# Patient Record
(demographics unavailable — no encounter records)

---

## 2024-12-30 NOTE — HISTORY OF PRESENT ILLNESS
[FreeTextEntry1] : Mr Rodriguez is a 64 y/o M s/p robotic assisted RUL lobectomy mediastinal lymph node dissection on 7/1/2024. Pt had  initial diagnosis of stage IIIa lung adenocarcinoma underwent induction chemoimmunotherapy. Unfortunately after the procedure he developed a complicated pleural effusion that required drainage. PMHx: current smoker/Former ETOH quit alcohol 26 years,Paranoid Schizophrenic, Pneumonia, COPD. Here today for review of CT.   ECOG 0 Fully Independent Lives with roommate Current Smoker- 3/4 PPD X 50 years Poor dentition Former ETOH quit alcohol 26 years  Their Healthcare team is as follows: PMD: Dr. Baires Oncologist: Dr. Doty  Emergency Contact- Sebas Rodriguez- [phone] Sister in law- Karli Rodriguez- [phone]

## 2024-12-30 NOTE — HISTORY OF PRESENT ILLNESS
[FreeTextEntry1] : Mr Rodriguez is a 64 y/o M s/p robotic assisted RUL lobectomy mediastinal lymph node dissection on 7/1/2024. Pt had  initial diagnosis of stage IIIa lung adenocarcinoma underwent induction chemoimmunotherapy. Unfortunately after the procedure he developed a complicated pleural effusion that required drainage. PMHx: current smoker/Former ETOH quit alcohol 26 years,Paranoid Schizophrenic, Pneumonia, COPD. Here today for review of CT.   ECOG 0 Fully Independent Lives with roommate Current Smoker- 3/4 PPD X 50 years Poor dentition Former ETOH quit alcohol 26 years  Their Healthcare team is as follows: PMD: Dr. Baires Oncologist: Dr. Doty  Emergency Contact- Sebas Rodriguez- [phone] Sister in law- Karli Rodriguez- [phone]

## 2025-02-10 NOTE — REASON FOR VISIT
[Home] : at home, [unfilled] , at the time of the visit. [Medical Office: ([facility])___] : at the medical office located in  [Verbal consent obtained from patient] : the patient, [unfilled] [FreeTextEntry1] : CT Chest Review

## 2025-02-10 NOTE — HISTORY OF PRESENT ILLNESS
[FreeTextEntry1] : Pt scheduled for telehealth however, unable to access telehealth platform due to lack of smart device/computer.  Mr Rodriguez is a 62 y/o M, Current smoker, Called today for review of CT Chest. Pt is s/p robotic assisted RUL lobectomy mediastinal lymph node dissection on 7/1/2024. Pt had  initial diagnosis of stage IIIa lung adenocarcinoma underwent induction chemoimmunotherapy. Unfortunately after the procedure he developed a complicated pleural effusion that required drainage. PMHx: current smoker/Former ETOH quit alcohol 26 years, Paranoid Schizophrenic, Pneumonia, COPD. Called today for review of CT.   ECOG 0 Fully Independent Lives with roommate Current Smoker- 3/4 PPD X 50 years Poor dentition Former ETOH quit alcohol 26 years  Their Healthcare team is as follows: PMD: Dr. Baires Oncologist: Dr. Doty  Emergency Contact- Sebas Rodriguez- [phone] Sister in law- Karli Rodriguez- [phone]

## 2025-02-10 NOTE — REASON FOR VISIT
[Home] : at home, [unfilled] , at the time of the visit. [Medical Office: ([facility])___] : at the medical office located in  [Verbal consent obtained from patient] : the patient, [unfilled] [FreeTextEntry1] : CT Chest Review

## 2025-02-10 NOTE — HISTORY OF PRESENT ILLNESS
[FreeTextEntry1] : Pt scheduled for telehealth however, unable to access telehealth platform due to lack of smart device/computer.  Mr Rodriguez is a 62 y/o M, Current smoker, Called today for review of CT Chest. Pt is s/p robotic assisted RUL lobectomy mediastinal lymph node dissection on 7/1/2024. Pt had  initial diagnosis of stage IIIa lung adenocarcinoma underwent induction chemoimmunotherapy. Unfortunately after the procedure he developed a complicated pleural effusion that required drainage. PMHx: current smoker/Former ETOH quit alcohol 26 years, Paranoid Schizophrenic, Pneumonia, COPD. Called today for review of CT.   ECOG 0 Fully Independent Lives with roommate Current Smoker- 3/4 PPD X 50 years Poor dentition Former ETOH quit alcohol 26 years  Their Healthcare team is as follows: PMD: Dr. Baires Oncologist: Dr. Doty  Emergency Contact- Sebas Rodriguez- [phone] Sister in law- Karli Rodriguez- [phone]

## 2025-02-10 NOTE — HISTORY OF PRESENT ILLNESS
[FreeTextEntry1] : Pt scheduled for telehealth however, unable to access telehealth platform due to lack of smart device/computer.  Mr Rodriguez is a 62 y/o M, Current smoker, Called today for review of CT Chest. Pt is s/p robotic assisted RUL lobectomy mediastinal lymph node dissection on 7/1/2024. Pt had  initial diagnosis of stage IIIa lung adenocarcinoma underwent induction chemoimmunotherapy. Unfortunately after the procedure he developed a complicated pleural effusion that required drainage. PMHx: current smoker/Former ETOH quit alcohol 26 years, Paranoid Schizophrenic, Pneumonia, COPD. Called today for review of CT.   ECOG 0 Fully Independent Lives with roommate Current Smoker- 3/4 PPD X 50 years Poor dentition Former ETOH quit alcohol 26 years  Their Healthcare team is as follows: PMD: Dr. Baires Oncologist: Dr. Doty  Emergency Contact- Sebas Rodriguez- [phone] Sister in law- Karli Rodriguez- [phone]

## 2025-02-10 NOTE — REASON FOR VISIT
[Home] : at home, [unfilled] , at the time of the visit. [Medical Office: ([facility])___] : at the medical office located in  [Verbal consent obtained from patient] : the patient, [unfilled] [FreeTextEntry1] : CT Chest Review

## 2025-02-10 NOTE — REASON FOR VISIT
[Home] : at home, [unfilled] , at the time of the visit. [Medical Office: ([facility])___] : at the medical office located in  [Verbal consent obtained from patient] : the patient, [unfilled] [FreeTextEntry1] : CT Chest Review

## 2025-02-10 NOTE — HISTORY OF PRESENT ILLNESS
[FreeTextEntry1] : Pt scheduled for telehealth however, unable to access telehealth platform due to lack of smart device/computer.  Mr Rodriguez is a 64 y/o M, Current smoker, Called today for review of CT Chest. Pt is s/p robotic assisted RUL lobectomy mediastinal lymph node dissection on 7/1/2024. Pt had  initial diagnosis of stage IIIa lung adenocarcinoma underwent induction chemoimmunotherapy. Unfortunately after the procedure he developed a complicated pleural effusion that required drainage. PMHx: current smoker/Former ETOH quit alcohol 26 years, Paranoid Schizophrenic, Pneumonia, COPD. Called today for review of CT.   ECOG 0 Fully Independent Lives with roommate Current Smoker- 3/4 PPD X 50 years Poor dentition Former ETOH quit alcohol 26 years  Their Healthcare team is as follows: PMD: Dr. Baires Oncologist: Dr. Doty  Emergency Contact- Sebas Rodriguez- [phone] Sister in law- Karli Rodriguez- [phone]

## 2025-02-10 NOTE — ASSESSMENT
[FreeTextEntry1] : Mr Fong is a 64 y/o M, Current smoker, Called today for review of CT Chest. Pt is s/p robotic assisted RUL lobectomy mediastinal lymph node dissection on 7/1/2024. Called today for review of CT.   CT reviewed - no findings of recurrence, fluid collection along liver Pt denies abdominal pain, fever  F/U CTS in 6 - 8 weeks with repeat CT  Labs ordered CMP, CBC for fluid collection found on CT  Spent 15 minutes   I, Rene Greene spoke with and reviewed the diagnostic images on patient:  JAY FONG on 02/04/2025 and agreed with my Nurse Practitioner's clinical note, and treatment plan. Patient with diagnosis of lung cancer he is a status post induction chemoimmunotherapy followed for right upper lobectomy mediastinal lymph node dissection.  Procedure date 7/1/2024.  Pathology report: ypT0 N0 poorly differentiated metastatic carcinoma.  Procedure complicated for postoperative pleural fluid collection treated with percutaneous drainage.  This was a televisit for surveillance follow-up with CT scan of the chest that was performed on 1/18/2025.  I reviewed the images with the patient on the phone no evidence of recurrence.  There is evidence of right cervical diaphragmatic fluid collection.  Patient denies pain fever or any discomfort no sign of infection.  Plan to repeat CT scan in 8 weeks to determine need for percutaneous drainage.  Patient understood and agreed with the plan.

## 2025-02-10 NOTE — ASSESSMENT
[FreeTextEntry1] : Mr Fong is a 62 y/o M, Current smoker, Called today for review of CT Chest. Pt is s/p robotic assisted RUL lobectomy mediastinal lymph node dissection on 7/1/2024. Called today for review of CT.   CT reviewed - no findings of recurrence, fluid collection along liver Pt denies abdominal pain, fever  F/U CTS in 6 - 8 weeks with repeat CT  Labs ordered CMP, CBC for fluid collection found on CT  Spent 15 minutes   I, Rene Greene spoke with and reviewed the diagnostic images on patient:  JAY FONG on 02/04/2025 and agreed with my Nurse Practitioner's clinical note, and treatment plan. Patient with diagnosis of lung cancer he is a status post induction chemoimmunotherapy followed for right upper lobectomy mediastinal lymph node dissection.  Procedure date 7/1/2024.  Pathology report: ypT0 N0 poorly differentiated metastatic carcinoma.  Procedure complicated for postoperative pleural fluid collection treated with percutaneous drainage.  This was a televisit for surveillance follow-up with CT scan of the chest that was performed on 1/18/2025.  I reviewed the images with the patient on the phone no evidence of recurrence.  There is evidence of right cervical diaphragmatic fluid collection.  Patient denies pain fever or any discomfort no sign of infection.  Plan to repeat CT scan in 8 weeks to determine need for percutaneous drainage.  Patient understood and agreed with the plan.

## 2025-03-10 NOTE — REVIEW OF SYSTEMS
[SOB on Exertion] : shortness of breath during exertion [Recent Change In Weight] : ~T no recent weight change [Negative] : ENT

## 2025-03-10 NOTE — CONSULT LETTER
[Dear  ___] : Dear  [unfilled], [Consult Letter:] : I had the pleasure of evaluating your patient, [unfilled]. [Please see my note below.] : Please see my note below. [Consult Closing:] : Thank you very much for allowing me to participate in the care of this patient.  If you have any questions, please do not hesitate to contact me. [Sincerely,] : Sincerely, [FreeTextEntry3] : Sonja

## 2025-03-10 NOTE — ASSESSMENT
[Curative] : Goals of care discussed with patient: Curative [FreeTextEntry1] : # metastatic poorly differentiated non-small cell carcinoma of lung primary. - Given the patient's smoking history and findings on PET scan suspect lung cancer Stage IIA. - pathology lymph node R4 consistent with diagnosis.  - MR brain DEBBI - PD-L1<1%/ NGS: Tumor Mutational [place]   - 19 Muts/Mb Microsatellite status -   MS-Stable - no actionable mutations - PFTS are adequate for surgery - 02/22/2024 MR head - No enhancing lesions to suggest brain metastasis. - 03/14/2024 started on neoadjuvant Carbo Taxol and OPDIVO for 3 cycles. -Carbo Taxol and OPDIVO for 3 cycles  last dose was on  04/29/2024 -7/1/2024  vats rul lobectomy  had no residual tumor   PLAN: -He has a collection now which is likely related to his surgical intervention in the past he may need drainage he has repeat CT chest pending with Dr. Avery Greene.  I provided him the referral -He is no longer smoking cigarettes as mentioned above has nicotine lozenges and does smoke cigars -CBC, CMP TSH today since had opdivo in past and sodium issues  -RTC in 6 months if remains DEBBI

## 2025-03-10 NOTE — HISTORY OF PRESENT ILLNESS
[Disease: _____________________] : Disease: [unfilled] [T: ___] : T[unfilled] [N: ___] : N[unfilled] [M: ___] : M[unfilled] [AJCC Stage: ____] : AJCC Stage: [unfilled] [de-identified] : CC: I have lung nodule    He is here at the request of Audrey Stephenson and Dr. Clay Donohue   This is 62-year-old male current smoker for about 55 years  3-4 packs per day,   smoked non filter in the past, he also has COPD, broken bones, poor dentition quit alcohol 26 years. He underwent a LDCT for lung cancer screening which lead to the findings bellow.  Mom had pancreatic cancer at 59 past, Celia  of liver cancer in her 90s  He had a CT chest done for lung cancer screening in 2023 this demonstrated a 1.2 cm spiculated right upper lobe nodule   PET CT scan from 2023 Hypermetabolic nodule in the right upper lobe 1.1 cm additionally there are FDG avid foci corresponding to nodules in the mediastinum on the right for example right paratracheal node 1.5 x 0.8 cm in the right anterior mediastinal node 0.9 x 0.5 cm SUV of 3.  Minimal activity in the bilateral subcentimeter short axis axillary nodes is more likely inflammatory or reactive in nature  He had colonoscopy 10 years ago and pending another one but will hold.  Sttes he had PFTs and Stress test a long time ago in [facility].  [de-identified] : 2/28/24 Returns for follow-up.  He is here with his sister.  Since last visit he had an MRI of the brain that was DEBBI he had a biopsy of lymph node done by Dr. Avery Greene which showed poorly differentiated non-small cell lung cancer.  He had PFTs which are adequate for surgical resection.  After discussion with Dr. Avery Greene we decided to proceed with neoadjuvant chemotherapy  4/4/2024 He is here for follow up. He did well with chemo. No issues. CBC today is fine.  04/25/2024 Reports feeling well at the baseline of his health status. 04/03/2024 he had an incisional biopsy of the tip of his tongue, and a tooth extraction by Dr. Librado Bull [phone] -epithelial hyperplasia and keratosis. He is very inpatient and wants to leave the exam room, but then decided to stay to be seen.  6/10/2024 He is here for follow up. He had his post treatment scans.   9/10/24 He is here for follow-up.  Since last visit he underwent repeat imaging which showed a nonspecific liver finding and MRI was done that showed a hematoma.  He then underwent resection by Dr. Avery Greene on July 1 which demonstrated a complete pathologic response, no residual tumor. he had CT chest on 9/4/2024 MPRESSION:  Since July 29, 2024   . Post right upper lobectomy with stable postsurgical changes.  Stable trace right pleural effusion/pleural thickening.  No suspicious pulmonary parenchymal lesions are identified.  No suspicious pulmonary nodules.  March 10, 2025 He is here for follow-up he had recent imaging on January 18 with CT chest which demonstrated a approximately 7 cm below the diaphragm organized with collection along the liver.  He is pending a repeat CT chest I gave him the referral and he will call to schedule  He feels well he does not smoke cigarettes he uses nicotine lozenges also does occasionally smokes a cigar

## 2025-03-10 NOTE — PHYSICAL EXAM
[Restricted in physically strenuous activity but ambulatory and able to carry out work of a light or sedentary nature] : Status 1- Restricted in physically strenuous activity but ambulatory and able to carry out work of a light or sedentary nature, e.g., light house work, office work [Normal] : normal appearance, no rash, nodules, vesicles, ulcers, erythema [de-identified] : He was not wheezing sounded clear

## 2025-04-03 NOTE — HISTORY OF PRESENT ILLNESS
[FreeTextEntry1] : Mr Rodriguez is a 64 y/o M, Current smoker, Called today for review of CT Chest. Pt is s/p robotic assisted RUL lobectomy mediastinal lymph node dissection on 7/1/2024. Pt had initial diagnosis of stage IIIa lung adenocarcinoma underwent induction chemoimmunotherapy. Unfortunately after the procedure he developed a complicated pleural effusion that required drainage. Last surveillance CT Chest revealed a fluid collection around his liver. PMHx: current smoker/Former ETOH quit alcohol 26 years, Paranoid Schizophrenic, Pneumonia, COPD. Called today for review of CT chest for further evaluation.  ECOG 0 Fully Independent Lives with roommate Current Smoker- 3/4 PPD X 50 years Poor dentition Former ETOH quit alcohol 26 years  Their Healthcare team is as follows: PMD: Dr. Baires Oncologist: Dr. Doty  Emergency Contact- Sebas Rodriguez- [phone] Sister in law- Karli Rodriguez- [phone]

## 2025-04-03 NOTE — REASON FOR VISIT
[Follow-Up: _____] : a [unfilled] follow-up visit [Home] : at home, [unfilled] , at the time of the visit. [Medical Office: ([facility])___] : at the medical office located in  [Telephone (audio)] : This telephonic visit was provided via audio only technology. [No access to tele-video equipment] : patient lacks access to tele-video equipment. [Verbal consent obtained from patient] : the patient, [unfilled] [FreeTextEntry1] : CT Chest review  [FreeTextEntry2] : 7/1/24

## 2025-04-03 NOTE — REASON FOR VISIT
[Follow-Up: _____] : a [unfilled] follow-up visit [Home] : at home, [unfilled] , at the time of the visit. [Medical Office: ([facility])___] : at the medical office located in  [Telephone (audio)] : This telephonic visit was provided via audio only technology. [No access to tele-video equipment] : patient lacks access to tele-video equipment. [Verbal consent obtained from patient] : the patient, [unfilled] [FreeTextEntry1] : CT Chest review  [FreeTextEntry2] : 7/1/24

## 2025-04-03 NOTE — ASSESSMENT
[FreeTextEntry1] :  Pt scheduled for telehealth however, unable to access telehealth platform due to lack of smart device/computer.  Mr Fong is a 64 y/o M, Current smoker, Called today for review of CT Chest. Pt is s/p robotic assisted RUL lobectomy mediastinal lymph node dissection on 7/1/2024. Called today for review of CT Chest for further evaluation of fluid collection surrounding his liver.  -Plan 15 minutes televisit rendered  #Hepatic Fluid collection  CT reviewed, revealing:  -Stable postsurgical changes -Prominent upper paratracheal lymph node -Previously described subdiaphragmatic fluid collection is not well visualized. -Previously described posterior hepatic parenchymal hypodensity appears less prominent and less well-defined. -Patient feels overall well -Improvement noted, no current intervention warranted -CBC / CMP (from 2/25) Unremarkable  -CT Chest in 6 months -F/U CTS for review I, Rene Greene spoke with and reviewed the diagnostic images on patient:  JAY FONG on 04/01/2025 and agreed with my Nurse Practitioner's clinical note and treatment plan.

## 2025-04-03 NOTE — HISTORY OF PRESENT ILLNESS
[FreeTextEntry1] : Mr Rodriguez is a 62 y/o M, Current smoker, Called today for review of CT Chest. Pt is s/p robotic assisted RUL lobectomy mediastinal lymph node dissection on 7/1/2024. Pt had initial diagnosis of stage IIIa lung adenocarcinoma underwent induction chemoimmunotherapy. Unfortunately after the procedure he developed a complicated pleural effusion that required drainage. Last surveillance CT Chest revealed a fluid collection around his liver. PMHx: current smoker/Former ETOH quit alcohol 26 years, Paranoid Schizophrenic, Pneumonia, COPD. Called today for review of CT chest for further evaluation.  ECOG 0 Fully Independent Lives with roommate Current Smoker- 3/4 PPD X 50 years Poor dentition Former ETOH quit alcohol 26 years  Their Healthcare team is as follows: PMD: Dr. Baires Oncologist: Dr. Doty  Emergency Contact- Sebas Rodriguez- [phone] Sister in law- Karli Rodriguez- [phone]

## 2025-04-03 NOTE — HISTORY OF PRESENT ILLNESS
[FreeTextEntry1] : Mr Rodriguez is a 62 y/o M, Current smoker, Called today for review of CT Chest. Pt is s/p robotic assisted RUL lobectomy mediastinal lymph node dissection on 7/1/2024. Pt had initial diagnosis of stage IIIa lung adenocarcinoma underwent induction chemoimmunotherapy. Unfortunately after the procedure he developed a complicated pleural effusion that required drainage. Last surveillance CT Chest revealed a fluid collection around his liver. PMHx: current smoker/Former ETOH quit alcohol 26 years, Paranoid Schizophrenic, Pneumonia, COPD. Called today for review of CT chest for further evaluation.  ECOG 0 Fully Independent Lives with roommate Current Smoker- 3/4 PPD X 50 years Poor dentition Former ETOH quit alcohol 26 years  Their Healthcare team is as follows: PMD: Dr. Baires Oncologist: Dr. Doty  Emergency Contact- Sebas Rodriguez- [phone] Sister in law- Karli Rodriguez- [phone]

## 2025-04-03 NOTE — REASON FOR VISIT
[Follow-Up: _____] : a [unfilled] follow-up visit [Home] : at home, [unfilled] , at the time of the visit. [Medical Office: ([facility])___] : at the medical office located in  [Telephone (audio)] : This telephonic visit was provided via audio only technology. [No access to tele-video equipment] : patient lacks access to tele-video equipment. [Verbal consent obtained from patient] : the patient, [unfilled] [FreeTextEntry1] : CT Chest review  [FreeTextEntry2] : 7/1/24